# Patient Record
Sex: MALE | Race: WHITE | NOT HISPANIC OR LATINO
[De-identification: names, ages, dates, MRNs, and addresses within clinical notes are randomized per-mention and may not be internally consistent; named-entity substitution may affect disease eponyms.]

---

## 2021-06-25 PROBLEM — Z00.00 ENCOUNTER FOR PREVENTIVE HEALTH EXAMINATION: Status: ACTIVE | Noted: 2021-06-25

## 2021-06-29 ENCOUNTER — APPOINTMENT (OUTPATIENT)
Dept: CT IMAGING | Facility: CLINIC | Age: 66
End: 2021-06-29
Payer: SELF-PAY

## 2021-06-29 ENCOUNTER — OUTPATIENT (OUTPATIENT)
Dept: OUTPATIENT SERVICES | Facility: HOSPITAL | Age: 66
LOS: 1 days | End: 2021-06-29

## 2021-06-29 PROCEDURE — 75571 CT HRT W/O DYE W/CA TEST: CPT | Mod: 26

## 2023-07-06 ENCOUNTER — NON-APPOINTMENT (OUTPATIENT)
Age: 68
End: 2023-07-06

## 2023-07-26 ENCOUNTER — APPOINTMENT (OUTPATIENT)
Dept: OTOLARYNGOLOGY | Facility: CLINIC | Age: 68
End: 2023-07-26
Payer: MEDICARE

## 2023-07-26 VITALS — HEIGHT: 72 IN | BODY MASS INDEX: 26.41 KG/M2 | WEIGHT: 195 LBS

## 2023-07-26 DIAGNOSIS — H93.299 OTHER ABNORMAL AUDITORY PERCEPTIONS, UNSPECIFIED EAR: ICD-10-CM

## 2023-07-26 DIAGNOSIS — F19.90 OTHER PSYCHOACTIVE SUBSTANCE USE, UNSPECIFIED, UNCOMPLICATED: ICD-10-CM

## 2023-07-26 DIAGNOSIS — Z78.9 OTHER SPECIFIED HEALTH STATUS: ICD-10-CM

## 2023-07-26 PROCEDURE — 92557 COMPREHENSIVE HEARING TEST: CPT

## 2023-07-26 PROCEDURE — 99203 OFFICE O/P NEW LOW 30 MIN: CPT

## 2023-07-26 PROCEDURE — 92567 TYMPANOMETRY: CPT

## 2023-07-26 RX ORDER — UBIDECARENONE/VIT E ACET 100MG-5
CAPSULE ORAL
Refills: 0 | Status: ACTIVE | COMMUNITY

## 2023-07-26 NOTE — HISTORY OF PRESENT ILLNESS
[facility]  [address] [place] [address]  [place] [address]  Phone: [phone]  Fax: [phone]                  Mane Ramires   3/8/2017 10:00 AM   Office Visit    Description:  Male : 1958   Provider:  Dasia Uriarte PA-C   Department:  [facility] [facility]           Reason for Visit     Low-back Pain     Back Pain     Neck Pain           Diagnoses this Visit        Comments    Cervicalgia    -  Primary     Osteoarthritis of spine with radiculopathy, cervical region         Chronic right-sided low back pain, with sciatica presence unspecified                To Do List           Future Appointments        Provider Department Dept Phone    3/17/2017 10:00 AM Davian Art MD [facility] - Cardiology [phone]    4/10/2017 8:00 AM Katarzyna Flanagan DO [facility] [phone]    5/3/2017 11:00 AM Dasia Uriarte PA-C [facility] [phone]      Goals (5 Years of Data)     None      [facility]     [facility] On [facility] Care Line -  Assistance  Registered nurses in the [facility] Center provide clinical advisement, health education, appointment booking, and other advisory services.  Call for this free service at [phone].             Medications           Message regarding Medications     Verify the changes and/or additions to your medication regime listed below are the same as discussed with your clinician today.  If any of these changes or additions are incorrect, please notify your healthcare provider.             Verify that the below list of medications is an accurate representation of the medications you are currently taking.  If none reported, the list may be blank. If incorrect, please contact your healthcare provider. Carry this list with you in case of emergency.           Current Medications     diclofenac sodium 1 % Gel APPLY 2 G TOPICALLY 4 (FOUR) TIMES DAILY.     "gabapentin (NEURONTIN) 100 MG capsule Take 1 capsule (100 mg total) by mouth every evening.    gabapentin (NEURONTIN) 300 MG capsule Take 300 mg by mouth once daily.     levocetirizine (XYZAL) 5 MG tablet Take 1 tablet (5 mg total) by mouth nightly as needed for Allergies.    lisinopril (PRINIVIL,ZESTRIL) 40 MG tablet TAKE 1 TABLET (40 MG TOTAL) BY MOUTH ONCE DAILY.    metoprolol succinate (TOPROL-XL) 100 MG 24 hr tablet TAKE 1 TABLET (100 MG TOTAL) BY MOUTH ONCE DAILY.    naproxen (EC NAPROSYN) 500 MG EC tablet Take 1 tablet (500 mg total) by mouth 2 (two) times daily.    PROAIR HFA 90 mcg/actuation inhaler INHALE 1 TO 2 PUFFS BY MOUTH EVERY 4 TO 6 HOURS AS NEEDED    tavaborole (KERYDIN) 5 % Usha Apply 1 application topically once daily.    diazepam (VALIUM) 10 MG Tab Take 1 tablet (10 mg total) by mouth once. Take 1 tablet 30 minutes prior to MRI.  Must have someone drive you to and from MRI.    testosterone cypionate (DEPOTESTOTERONE CYPIONATE) 200 mg/mL injection Inject 1 mL (200 mg total) into the muscle every 14 (fourteen) days. 3 ml syringe with 18 g needle  to draw  X 10   22 g 1 inch needle to inject x 10           Clinical Reference Information           Your Vitals Were     BP Pulse Height Weight BMI    150/86 (BP Location: Right arm, Patient Position: Sitting, BP Method: Automatic) 78 5' 5" (1.651 m) 76.9 kg (169 lb 8.5 oz) 28.21 kg/m2      Blood Pressure          Most Recent Value    BP  (!)  150/86      Allergies as of 3/8/2017     Sudafed [Pseudoephedrine Hcl]    Codeine    Hydrocodone    Cortisone      Immunizations Administered on Date of Encounter - 3/8/2017     None      Orders Placed During Today's Visit      Normal Orders This Visit    Ambulatory Referral to Physical/Occupational Therapy     Future Labs/Procedures Expected by Expires    EMG - 2 Extremities  As directed 3/8/2018      Language Assistance Services     ATTENTION: Language assistance services are available, free of charge. Please " call [phone].      ATENCIÓN: Si habla español, tiene a slade disposición servicios gratuitos de asistencia lingüística. Llame al [phone].     CHÚ Ý: N?u b?n nói Ti?ng Vi?t, có các d?ch v? h? tr? ngôn ng? mi?n phí dành cho b?n. G?i s? [phone].         [facility] complies with applicable Federal civil rights laws and does not discriminate on the basis of race, color, national origin, age, disability, or sex.         [de-identified] : JR NAGEL has a history of tinnitus in both ear for about 2 years. Hissing sound with no pulsations. No precipitating event noted. No prior ear disease or noise exposure.

## 2023-07-26 NOTE — ASSESSMENT
[FreeTextEntry1] : I have carefully reviewed the etiologies of tinnitus with the patient and have reviewed management options including coping strategies.  I have offered a referral to an audiologist for further evaluation and counseling.\par \par Limited high-frequency hearing loss which may be age related.  Other etiologies are possible.  I discussed this with the patient.\par \par I have recommended follow-up in 6 months with repeat audiometry to rule out a progressive disorder.

## 2023-07-26 NOTE — CONSULT LETTER
[Please see my note below.] : Please see my note below. [FreeTextEntry2] : Dear MARIO RUBIO  [FreeTextEntry1] : Thank you for allowing me to participate in the care of JR NAGEL .\par Please see the attached visit note.\par \par \par \par Don Null\par Otology\par Medical Director of [facility]\par Department of Otolaryngology\par [facility]

## 2023-07-26 NOTE — DATA REVIEWED
[de-identified] : In light of the patients current symptoms, Complete audiometry was ordered and completed today. I have interpreted these results and reviewed them in detail with the patient.\par \par High-frequency hearing loss affecting the left ear greater than the right.  Stiff tympanometry bilaterally

## 2024-02-20 ENCOUNTER — APPOINTMENT (OUTPATIENT)
Dept: OTOLARYNGOLOGY | Facility: CLINIC | Age: 69
End: 2024-02-20
Payer: MEDICARE

## 2024-02-20 DIAGNOSIS — H90.3 SENSORINEURAL HEARING LOSS, BILATERAL: ICD-10-CM

## 2024-02-20 DIAGNOSIS — H93.13 TINNITUS, BILATERAL: ICD-10-CM

## 2024-02-20 PROCEDURE — 92557 COMPREHENSIVE HEARING TEST: CPT

## 2024-02-20 PROCEDURE — 92567 TYMPANOMETRY: CPT

## 2024-02-20 PROCEDURE — 99213 OFFICE O/P EST LOW 20 MIN: CPT

## 2024-02-20 NOTE — ASSESSMENT
[FreeTextEntry1] : Apparently stable hearing loss and tinnitus.  We discussed potential etiologies.  I have recommended continued clinical monitoring versus MRI scan.  Follow-up with repeat audiometry in 6 months.

## 2024-02-20 NOTE — HISTORY OF PRESENT ILLNESS
[de-identified] : JR NAGEL has a history of tinnitus in both ear for about 2 years. Hissing sound with no pulsations. No precipitating event noted. No prior ear disease or noise exposure.  [FreeTextEntry1] :  02/20/2024 Continues to notice tinnitus.  This has not been particularly intrusive.  No perceived change in hearing.  No dizziness or balance issues.

## 2024-02-20 NOTE — DATA REVIEWED
[de-identified] : In light of the patients current symptoms, Complete audiometry was ordered and completed today. I have interpreted these results and reviewed them in detail with the patient.  Moderate sensorineural hearing loss bilaterally, asymmetric

## 2024-09-26 ENCOUNTER — APPOINTMENT (OUTPATIENT)
Dept: OTOLARYNGOLOGY | Facility: CLINIC | Age: 69
End: 2024-09-26